# Patient Record
Sex: FEMALE | Race: OTHER | Employment: FULL TIME | URBAN - METROPOLITAN AREA
[De-identification: names, ages, dates, MRNs, and addresses within clinical notes are randomized per-mention and may not be internally consistent; named-entity substitution may affect disease eponyms.]

---
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
## 2021-09-30 ENCOUNTER — OFFICE VISIT (OUTPATIENT)
Dept: FAMILY MEDICINE CLINIC | Facility: CLINIC | Age: 21
End: 2021-09-30

## 2021-09-30 VITALS
HEART RATE: 105 BPM | SYSTOLIC BLOOD PRESSURE: 108 MMHG | BODY MASS INDEX: 46.78 KG/M2 | RESPIRATION RATE: 18 BRPM | DIASTOLIC BLOOD PRESSURE: 69 MMHG | HEIGHT: 63 IN | TEMPERATURE: 99 F | WEIGHT: 264 LBS | OXYGEN SATURATION: 98 %

## 2021-09-30 DIAGNOSIS — Z11.1 SCREENING FOR TUBERCULOSIS: ICD-10-CM

## 2021-09-30 DIAGNOSIS — Z02.1 PHYSICAL EXAM, PRE-EMPLOYMENT: Primary | ICD-10-CM

## 2021-09-30 PROCEDURE — 86580 TB INTRADERMAL TEST: CPT | Performed by: PHYSICIAN ASSISTANT

## 2021-09-30 PROCEDURE — 3008F BODY MASS INDEX DOCD: CPT | Performed by: PHYSICIAN ASSISTANT

## 2021-09-30 PROCEDURE — 3074F SYST BP LT 130 MM HG: CPT | Performed by: PHYSICIAN ASSISTANT

## 2021-09-30 PROCEDURE — 3078F DIAST BP <80 MM HG: CPT | Performed by: PHYSICIAN ASSISTANT

## 2021-09-30 PROCEDURE — 99395 PREV VISIT EST AGE 18-39: CPT | Performed by: PHYSICIAN ASSISTANT

## 2021-09-30 NOTE — PROGRESS NOTES
CHIEF COMPLAINT:     Patient presents with:  Employment Physical      HPI:   Tabitha Libman is a 24year old female who presents for physical exam for pre-employment. 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   No recent fevers, chill breath sounds. No wheezing, rhonchi, or rales. CARDIO: RRR without murmur  GI: BS's present x4., No tenderness of palpation.   No obvious masses or palpable organomegaly   MUSCULOSKELETAL: back is not tender, ROM of the back fully intact  EXTREMITIES: no c

## 2021-09-30 NOTE — PATIENT INSTRUCTIONS
Please return to clinic 10-2-21 AFTER 2:34pm or 10-3-21 before 2:34pm.        TB Screening (Skin)   Does this test have other names?   Tuberculin test, TST, Mantoux, PPD (purified protein derivative)   What is this test?  This test shows if you have been in provider will advise only 1 screening test based on your case. How is this test done? A small amount of fluid called tuberculin is injected under the skin on your inner forearm. A [place] is drawn around the injection site with long-lasting ink.  You need sure your healthcare provider knows about all medicines, herbs, vitamins, and supplements you are taking.  This includes medicines that don't need a prescription and any illegal drugs you may use.    Ora last reviewed this educational content on 11/1/2

## 2021-10-02 ENCOUNTER — OFFICE VISIT (OUTPATIENT)
Dept: FAMILY MEDICINE CLINIC | Facility: CLINIC | Age: 21
End: 2021-10-02

## 2021-10-02 DIAGNOSIS — Z11.1 ENCOUNTER FOR PPD SKIN TEST READING: Primary | ICD-10-CM

## 2021-10-02 NOTE — PROGRESS NOTES
Recent Results (from the past 24 hour(s))   TB INTRADERMAL TEST    Collection Time: 10/02/21  2:32 PM   Result Value Ref Range    Date Given: 9/30/21     Site: LFA     INDURATION (PPD) 0.0 0.0 - 11 mm

## 2022-02-05 ENCOUNTER — HOSPITAL ENCOUNTER (EMERGENCY)
Facility: HOSPITAL | Age: 22
Discharge: HOME | End: 2022-02-06
Attending: EMERGENCY MEDICINE
Payer: COMMERCIAL

## 2022-02-05 VITALS
HEART RATE: 119 BPM | SYSTOLIC BLOOD PRESSURE: 112 MMHG | TEMPERATURE: 100 F | WEIGHT: 240 LBS | DIASTOLIC BLOOD PRESSURE: 69 MMHG | BODY MASS INDEX: 43 KG/M2 | OXYGEN SATURATION: 98 % | RESPIRATION RATE: 20 BRPM

## 2022-02-05 DIAGNOSIS — R07.9 CHEST PAIN, UNSPECIFIED TYPE: Primary | ICD-10-CM

## 2022-02-05 DIAGNOSIS — F41.9 ANXIETY: ICD-10-CM

## 2022-02-05 PROCEDURE — 93010 ELECTROCARDIOGRAM REPORT: CPT | Performed by: EMERGENCY MEDICINE

## 2022-02-05 PROCEDURE — 99284 EMERGENCY DEPT VISIT MOD MDM: CPT

## 2022-02-05 PROCEDURE — 93005 ELECTROCARDIOGRAM TRACING: CPT

## 2022-02-06 ENCOUNTER — APPOINTMENT (OUTPATIENT)
Dept: GENERAL RADIOLOGY | Facility: HOSPITAL | Age: 22
End: 2022-02-06
Attending: EMERGENCY MEDICINE
Payer: COMMERCIAL

## 2022-02-06 PROCEDURE — 71045 X-RAY EXAM CHEST 1 VIEW: CPT | Performed by: EMERGENCY MEDICINE

## 2022-02-06 RX ORDER — LORAZEPAM 1 MG/1
1 TABLET ORAL ONCE
Status: COMPLETED | OUTPATIENT
Start: 2022-02-06 | End: 2022-02-06

## 2022-02-06 NOTE — ED QUICK NOTES
Pt a/ox4, respirations unlabored, speech full/clear, gait steady, no acute distress. All ED orders completed. Pt instructed to return to ED for any new/severe s/s or as directed by provider, and to see PMD/specialist ASAP or as directed by provider. MD Jonel Lea at bedside to address further concerns w/ pt.

## 2022-02-06 NOTE — ED INITIAL ASSESSMENT (HPI)
Pt c/o chest pain since yesterday. Denies ZARIA. Denies exacerbating/relieving factors.  No meds taken prior to arrival.

## 2022-10-07 ENCOUNTER — APPOINTMENT (OUTPATIENT)
Dept: GENERAL RADIOLOGY | Facility: HOSPITAL | Age: 22
End: 2022-10-07
Payer: COMMERCIAL

## 2022-10-07 ENCOUNTER — HOSPITAL ENCOUNTER (EMERGENCY)
Facility: HOSPITAL | Age: 22
Discharge: HOME | End: 2022-10-07
Attending: EMERGENCY MEDICINE
Payer: COMMERCIAL

## 2022-10-07 VITALS
HEART RATE: 108 BPM | WEIGHT: 210 LBS | SYSTOLIC BLOOD PRESSURE: 129 MMHG | TEMPERATURE: 97 F | OXYGEN SATURATION: 98 % | RESPIRATION RATE: 16 BRPM | HEIGHT: 63 IN | BODY MASS INDEX: 37.21 KG/M2 | DIASTOLIC BLOOD PRESSURE: 61 MMHG

## 2022-10-07 DIAGNOSIS — S92.351A CLOSED DISPLACED FRACTURE OF FIFTH METATARSAL BONE OF RIGHT FOOT, INITIAL ENCOUNTER: Primary | ICD-10-CM

## 2022-10-07 PROCEDURE — 99284 EMERGENCY DEPT VISIT MOD MDM: CPT

## 2022-10-07 PROCEDURE — 73630 X-RAY EXAM OF FOOT: CPT

## 2022-10-11 ENCOUNTER — PATIENT OUTREACH (OUTPATIENT)
Dept: CASE MANAGEMENT | Age: 22
End: 2022-10-11

## 2022-10-11 ENCOUNTER — TELEPHONE (OUTPATIENT)
Dept: PODIATRY CLINIC | Facility: CLINIC | Age: 22
End: 2022-10-11

## 2022-10-11 NOTE — TELEPHONE ENCOUNTER
Onur Araya from [address] [place] [address] requesting ER follow up for a boot on right foot fracture. States patient is aware doctor is in [place]. No appointments available. Please call patient back.  Please advise

## 2022-10-11 NOTE — PROGRESS NOTES
VM received; pt requesting assistance w/scheduling apt (dc 10/07)    Dr Dayami Blood  [address]  [phone]  Follow up 5 days

## 2022-10-11 NOTE — PROGRESS NOTES
Dr Cari Espinosa   [phone]  Follow up 5 days  Office to call -availability    Confirmed w/ pt  Closing encounter

## 2022-10-13 NOTE — PROGRESS NOTES
Called pt back Pt requesting office in [place] only and Dr. Guru Valerio sees patients in [facility] only. First available appt is 10/31/2022. Pt requesting earlier appt and office location closer to home. Pt advised to  call insurance for other podiatrist.  Encounter closed

## (undated) NOTE — Clinical Note
Date: 4/21/2017    Patient Name: Reba Doss          To Whom it may concern: This letter has been written at the patient's request. The above patient was seen at the [facility] for treatment of a medical condition.     This patient shoul

## (undated) NOTE — MR AVS SNAPSHOT
[facility]  [phone]               Thank you for choosing us for your health care visit with Holden Gale NP.   We are glad to serve you and happy to provide you with this summary of not need to be treated with antibiotics. Until you receive the results of the strep test, you should stay home from work. If your child is being tested, he or she should stay home from school. Home care  · Rest at home.  Drink plenty of fluids so you won't Follow up with your healthcare provider or our staff if you don't get better over the next week. When to seek medical advice  Call your healthcare provider right away if any of these occur:  · Fever as directed by your healthcare provider.  For children, s Antibiotics don’t work against viral infections. In some cases of a viral infection, an antiviral medication may be prescribed.  Once the problem has been treated, your child may need surgery to remove the tonsils if they become infected often or cause vinayak © 6976-4503 [address], [address]. All rights reserved. This information is not intended as a substitute for professional medical care. Always follow your healthcare professional's instructions.              [place] initiative of the American Academy of Pediatrics    Fact Sheet: Healthy Active Living for Families    Healthy nutrition starts as early as infancy with breastfeeding.  Once your baby begins eating solid foods, introduce nutritious foods early on and ofte

## (undated) NOTE — LETTER
Date & Time: 10/7/2022, 10:13 PM  Patient: Sonia Del Valle  Encounter Provider(s):    Gracia Hooper MD       To Whom It May Concern:    Bryce Kim was seen and treated in our department on 10/7/2022. She can return to work with these limitations: Must use crutches until cleared by podiatry.     If you have any questions or concerns, please do not hesitate to call.        _____________________________  Physician/[facility] Signature